# Patient Record
Sex: FEMALE | Race: WHITE | Employment: FULL TIME | URBAN - METROPOLITAN AREA
[De-identification: names, ages, dates, MRNs, and addresses within clinical notes are randomized per-mention and may not be internally consistent; named-entity substitution may affect disease eponyms.]

---

## 2024-01-04 ENCOUNTER — OFFICE VISIT (OUTPATIENT)
Age: 39
End: 2024-01-04

## 2024-01-04 VITALS
DIASTOLIC BLOOD PRESSURE: 82 MMHG | WEIGHT: 133 LBS | OXYGEN SATURATION: 99 % | HEIGHT: 66 IN | RESPIRATION RATE: 16 BRPM | SYSTOLIC BLOOD PRESSURE: 113 MMHG | BODY MASS INDEX: 21.38 KG/M2 | TEMPERATURE: 99.5 F | HEART RATE: 91 BPM

## 2024-01-04 DIAGNOSIS — J02.0 STREP THROAT: Primary | ICD-10-CM

## 2024-01-04 LAB
STREP PYOGENES DNA, POC: POSITIVE
VALID INTERNAL CONTROL, POC: YES

## 2024-01-04 RX ORDER — AMOXICILLIN AND CLAVULANATE POTASSIUM 875; 125 MG/1; MG/1
1 TABLET, FILM COATED ORAL 2 TIMES DAILY
Qty: 20 TABLET | Refills: 0 | Status: SHIPPED | OUTPATIENT
Start: 2024-01-04 | End: 2024-01-14

## 2024-01-04 NOTE — PATIENT INSTRUCTIONS
Thank you for visiting [facility].    Important:  **Change toothbrush after 24 hour of antibiotic use**  Avoid kissing or sharing beverages/utensils until all symptoms resolve    Please follow-up with the pediatrician within 2-5 days if your child's signs alhaji symptoms have not resolved or worsened.    Please go immediately to the Emergency Department if you develop difficulty swallowing/breathing, respiratory distress, hoarse voice, drooling, difficulty opening jaw, stiff or swollen neck, shortness of breath, or uncontrollable fever/nausea/vomiting.    Strep Throat Symptomatic Relief:  Bacterial Infection:  Antibiotic:  Take as prescribed on full stomach until you complete entire course    Pain/fever/chills/body aches:  Tylenol every 4 hours OR Ibuprofen every 6 hours as needed    Sore Throat:  Lozenges, as needed.  Cepacol lozenges will help numb the throat  Chloraseptic spray also helps to numb throat pain  Salt water gargles to soothe throat pain with 1/2-1 tsp of Benadryl  Ice, popsicles, cold food to soothe throat

## 2024-01-04 NOTE — PROGRESS NOTES
A DIRECT, DNA PROBE  Other orders  -     amoxicillin-clavulanate (AUGMENTIN) 875-125 MG per tablet; Take 1 tablet by mouth 2 times daily for 10 days      No orders to display   Vital signs stable  Patient alert and competent  Patient in no acute distress and afebrile    Rapid strep positive    Patient is previously healthy and immunocompetent presenting with symptoms consistent with streptococcal pharyngitis.  The exam did not reveal an ill-appearing patient, a toxic-appearing patient, a decreased O2 saturation, rash or respiratory distress.  The exam did not reveal dehydration, altered mental status, listlessness, meningeal signs or lethargy.  There was no suggestion of abscess, retropharyngeal processes or sepsis.    The patient was instructed to go to the ER with worsening, changing symptoms, or inability to drink or take medications by  mouth.  Routine discharge counseling ws given to the patient and the patient understands that worsening, changing or persistent symptoms should prompt an immediate visit to the emergency department.  The importance of appropriate follow up was also discussed with the patient.  More extensive discharge instructions were given in the patient's discharge paperwork.        I have discussed the results, diagnosis and treatment plan with the patient.  The patient also understands that early in the process of an illness, an urgent care workup can be falsely reassuring.  Routine discharge counseling and specific return precautions discussed with patient and the patient understands that worsening, changing or persistent symptoms should prompt an immediate return to the urgent care or emergency department.  Patient/Guardian expressed understanding and agrees with the discharge plan.  No further questions at time of discharge.    Brandy Covington, APRN - CNP

## 2024-01-12 ASSESSMENT — ENCOUNTER SYMPTOMS: SORE THROAT: 1
